# Patient Record
Sex: MALE | Race: ASIAN | NOT HISPANIC OR LATINO
[De-identification: names, ages, dates, MRNs, and addresses within clinical notes are randomized per-mention and may not be internally consistent; named-entity substitution may affect disease eponyms.]

---
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
## 2021-04-21 ENCOUNTER — OFFICE VISIT - HEALTHEAST (OUTPATIENT)
Dept: FAMILY MEDICINE | Facility: CLINIC | Age: 21
End: 2021-04-21

## 2021-04-21 DIAGNOSIS — H01.002 BLEPHARITIS OF RIGHT LOWER EYELID, UNSPECIFIED TYPE: ICD-10-CM

## 2021-04-21 RX ORDER — POLYMYXIN B SULFATE AND TRIMETHOPRIM 1; 10000 MG/ML; [USP'U]/ML
1 SOLUTION OPHTHALMIC EVERY 4 HOURS
Qty: 1 BOTTLE | Refills: 0 | Status: SHIPPED | OUTPATIENT
Start: 2021-04-21

## 2021-05-30 VITALS — HEIGHT: 62 IN | WEIGHT: 140 LBS | BODY MASS INDEX: 25.76 KG/M2

## 2021-05-31 VITALS — BODY MASS INDEX: 26.31 KG/M2 | WEIGHT: 143 LBS | HEIGHT: 62 IN

## 2021-06-05 VITALS
HEART RATE: 77 BPM | WEIGHT: 148 LBS | BODY MASS INDEX: 27.51 KG/M2 | RESPIRATION RATE: 16 BRPM | SYSTOLIC BLOOD PRESSURE: 133 MMHG | DIASTOLIC BLOOD PRESSURE: 82 MMHG | OXYGEN SATURATION: 99 %

## 2021-06-10 NOTE — PROGRESS NOTES
1. Routine general medical examination at a health care facility    2. Immunization due  - HPV vaccine 9 valent 3 dose IM  - Varicella vaccine subq  - Meningococcal MCV4P    3. TB lung, latent  Previously diagnosed with positive Qunatiferon gold, however his prescription for isoniazid did not go through correctly.  Discussed rationale for treating latent TB - also making sure other members of his family are tested  - pyridoxine, vitamin B6, (VITAMIN B-6) 25 MG tablet; Take 1 tablet (25 mg total) by mouth daily.  Dispense: 90 tablet; Refill: 2  - isoniazid (NYDRAZID) 300 MG tablet; Take 1 tablet (300 mg total) by mouth daily.  Dispense: 90 tablet; Refill: 2    Counseling:  Diet  immunizations  Exercise  Family planning  Preventive maintenance  Wandy Ghosh MD    ------------------        Do you have any concerns today? - NO    He is playing tennis this spring, as a senior in high school, but he did have a sports physical last year    Habits:  Exercise: tennis, one weekend a month with   Diet: regular  Do you take any herbs or supplements that were not prescribed by a doctor? no  Are you taking calcium supplements? no - milk every other day - discussed  Are you taking aspirin daily? no  Do you wear seat belts? YES  Do you bike or ride a motorcycle? Do you wear a helmet? NA      History   Smoking Status     Never Smoker   Smokeless Tobacco     Not on file     History   Alcohol Use     Yes     Comment: occasionaly            History:  Are you sexually active? no - never  Does your partner need to use family planning? 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 - hasn't decided where yet    Preventive  BP Readings from Last 3 Encounters:   04/14/17 116/64   06/17/16 96/58     Immunization History   Administered Date(s) Administered     DTaP / Hep B / IPV 05/28/2004, 10/12/2005     DTaP, historic 10/05/2004, 12/15/2004,  03/11/2005     HPV 9 Valent 04/14/2017     Hep A, historic 02/11/2013, 08/01/2014     Hep B, historic 10/05/2004, 03/11/2005, 08/25/2005, 10/12/2005     IPV 05/28/2004, 10/05/2004, 12/15/2004, 03/11/2005     Influenza [id number], 01/11/2010     Influenza, inj, historic 10/27/2007, 11/21/2008     Influenza, seasonal,quad inj 6-35 mos 12/01/2009, 11/14/2011, 10/04/2012     Influenza,recombinant,Inj,Pf (RIV3)18-49yrs 12/23/2014     Influenza,seasonal quad, PF, 36+MOS 01/16/2017     MMR 05/28/2004, 10/05/2004, 08/25/2005     Meningococcal MCV4P 02/11/2013, 04/14/2017     Tdap 02/11/2013     Typhoid Live 02/11/2013     Varicella 05/28/2004, 04/14/2017       Patient Active Problem List   Diagnosis     Short Stature     Acne     Eczema     No current outpatient prescriptions on file prior to visit.     No current facility-administered medications on file prior to visit.        Review of Systems  Do you have pain that bothers you in your daily life? no    Constitutional: negative for weight change or recent illness  Eyes: negative for change in vision  Ears, nose, mouth, throat, and face: negative for sore throat and nasal drainage  Respiratory: negative for cough or dyspnea  Cardiovascular: negative for chest pain and palpitations  Gastrointestinal: negative for abdominal pain and change in bowel habits  Genitourinary:negative for dysuria  Breast: negative for lumps or pains  Integument: no rashes or lesions  Musculoskeletal:negative for arthralgias and myalgias  Neurological: negative for dizziness, headaches and paresthesia  Behavioral/Psych: negative for depression       Objective:     Vitals:    04/14/17 0942   BP: 116/64   Pulse: 75   Temp: 98.3  F (36.8  C)   SpO2: 98%     Body mass index is 26.02 kg/(m^2).    Physical Exam:  General Appearance: Alert, cooperative, no distress, appears stated age  Head: Normocephalic, without obvious abnormality, atraumatic  Eyes: PERRL, conjunctiva/corneas clear, EOM's intact  Ears:  Normal TM's and external ear canals, both ears  Nose: Nares normal, septum midline,mucosa normal, no drainage  Throat: Lips, mucosa, and tongue normal; teeth and gums normal  Neck: Supple, symmetrical, trachea midline, no adenopathy;  thyroid: not enlarged, symmetric, no tenderness/mass/nodules; no carotid bruit or JVD  Lungs: Clear to auscultation bilaterally, respirations unlabored  Heart: Regular rate and rhythm, S1 and S2 normal, no murmur, rub, or gallop,  Abdomen: Soft, non-tender, bowel sounds active all four quadrants,  no masses, no organomegaly  Musculoskeletal: Normal range of motion. No joint swelling or deformity.   Extremities: Extremities normal, atraumatic, no cyanosis or edema  Skin:no rashes or worrisome lesions  Lymph nodes: Cervical, supraclavicular, and infraclavicular nodes normal  Neurologic: He is alert. He has normal reflexes.   Psychiatric: He has a normal mood and affect.

## 2021-06-11 NOTE — PROGRESS NOTES
"CHIEF COMPLAINT: Drew Morales had concerns including Knee Pain.    [place]: 1.............. had concerns including Knee Pain.    1. Effusion, right knee    2. Patellofemoral dysfunction of right knee    3. Chondromalacia    4. TB lung, latent      No problem-specific Assessment & Plan notes found for this encounter.      CC:              Right knee pain. Pt was playing tennis.    What's it like:                      Feels like there is a needle stabbing him  How long is it ongoing:      3 weels  What makes it worse :       Sitting a certain  What makes it better:         n/a  0/10-10/10:Pain/Intesity     6 or 7       3 weeks knee pain most likely from playing tennis right leg only  \"Twisted and felt an injury  More so on the medial side  He is having mild \"locking  Describes it as a hurts  Worse with doing prolonged sitting as well as crossing his legs  Better with sitting straight or straightening his leg out  No prior injury does feel like there is fluid    Associated Sx:   Numbness  none    Otherwise healthy currently on INH no side effects    SUBJECTIVE:  Drew Morales is a 17 y.o. male    No past medical history on file.  Past Surgical History:   Procedure Laterality Date     NO PAST SURGERIES       Review of patient's allergies indicates no known allergies.  Current Outpatient Prescriptions   Medication Sig Dispense Refill     isoniazid (NYDRAZID) 300 MG tablet Take 1 tablet (300 mg total) by mouth daily. 90 tablet 2     pyridoxine, vitamin B6, (VITAMIN B-6) 25 MG tablet Take 1 tablet (25 mg total) by mouth daily. 90 tablet 2     naproxen (NAPROSYN) 500 MG tablet Take 1 tablet (500 mg total) by mouth 2 (two) times a day with meals. 28 tablet 0     No current facility-administered medications for this visit.      Family History   Problem Relation Age of Onset     No Medical Problems Mother      No Medical Problems Father      No Medical Problems Sister      No Medical Problems Brother      Diabetes Maternal Grandmother  " "    Hypertension Maternal Grandmother      No Medical Problems Paternal Grandmother      No Medical Problems Brother      No Medical Problems Brother      No Medical Problems Brother      No Medical Problems Sister      No Medical Problems Sister      Social History     Social History     Marital status: Single     Spouse name: N/A     Number of children: N/A     Years of education: N/A     Social History Main Topics     Smoking status: Never Smoker     Smokeless tobacco: Not on file     Alcohol use Yes      Comment: occasionaly     Drug use: No     Sexual activity: No     Other Topics Concern     Not on file     Social History Narrative     Patient Active Problem List   Diagnosis     Short Stature     Acne     Eczema                                              SOCIAL: He  reports that he has never smoked. He does not have any smokeless tobacco history on file. He reports that he drinks alcohol. He reports that he does not use illicit drugs.    REVIEW OF SYSTEMS:     Family reviewed and not pertinent to presenting issue   Review of systems otherwise negative as requested from patient, except   Positive ROS outlined and discussed in [place].    OBJECTIVE:  /68  Pulse 88  Resp 14  Ht 5' 1.5\" (1.562 m)  Wt 143 lb (64.9 kg)  SpO2 98%  BMI 26.58 kg/m2    GENERAL:     No acute distress.   Alert and oriented X 3         Physical:    Grind test  Effusion right knee  Patella slightly ballotable  Fluid below the patellar tendon  Insertion of the patellar tendon into the tibial tuberosity nontender  Insertion of the patellar tendon into the kneecap minimally tender  No joint line tenderness bilaterally  Drawer signs negative  Positive crepitus with passive range of motion of the kneecap      ASSESSMENT & PLAN      Drew was seen today for knee pain.    Diagnoses and all orders for this visit:    Effusion, right knee  -     MR Knee Without Contrast Right; Future    Patellofemoral dysfunction of right knee  -     MR " Knee Without Contrast Right; Future    Chondromalacia  -     MR Knee Without Contrast Right; Future    TB lung, latent  -     Hepatic Profile    Other orders  -     naproxen (NAPROSYN) 500 MG tablet; Take 1 tablet (500 mg total) by mouth 2 (two) times a day with meals.      Return if symptoms worsen or fail to improve.       Anticipatory Guidance and Symptomatic Cares Discussed   Advised to call back directly if there are further questions, or if these symptoms fail to improve as anticipated or worsen.  Return to clinic if patient has a clinical concern that warrants an exam.         25  Min Total Time, > 50% counseling and coordination of Care    Mauricio Esteban MD  Family Medicine   [facility] [id number] [phone]

## 2021-06-18 NOTE — PATIENT INSTRUCTIONS - HE
Patient Instructions by Maggie Lin MD at 4/21/2021 11:20 AM     Author: Maggie Lin MD Service: -- Author Type: Physician    Filed: 4/21/2021 11:45 AM Encounter Date: 4/21/2021 Status: Signed    : Maggie Lin MD (Physician)        Patient Education     Treating Blepharitis: Self-Care    To treat the problem, keep your eyelids clean. Warm compresses can reduce redness and swelling, and help clean your eyelids, too. You may also need to wash the area gently with an eyelid scrub when you wake up.  To apply a warm compress:  1. Wash your hands with soap and warm water.  2. Wet a clean washcloth with warm water. Then wring it out.  3. Close your eyes and place the washcloth over your eyelids for 3 to 5 minutes. This helps loosen scales or crusts.  4. Wet the washcloth again as often as needed to keep it warm.  Repeat 2 or more times a day. Use a clean washcloth each time.  To use an eyelid scrub:  1. Wash your hands with soap and warm water.  2. Use a ready-made eyelid scrub. Or mix 3 drops of baby shampoo in 1/4 cup of warm water.  3. Dip a lint-free pad, cotton swab, or clean washcloth in the scrub.  4. Close one eye and gently scrub the base of the eyelid.  5. Rinse the lid in cool water and dry with a clean towel.  6. Repeat on your other eye.  Date Last Reviewed: 3/1/2018    2363-0724 [employer]. [address] [place] [id number]. All rights reserved. This information is not intended as a substitute for professional medical care. Always follow your healthcare professional's instructions.

## 2021-06-30 NOTE — PROGRESS NOTES
Progress Notes by Maggie Lin MD at 4/21/2021 11:20 AM     Author: Maggie Lin MD Service: -- Author Type: Physician    Filed: 4/21/2021 12:37 PM Encounter Date: 4/21/2021 Status: Signed    : Maggie Lin MD (Physician)       OFFICE VISIT - FAMILY MEDICINE     ASSESSMENT AND PLAN     1. Blepharitis of right lower eyelid, unspecified type  polymyxin B-trimethoprim (FOR POLYTRIM) 10,000 unit- 1 mg/mL Drop ophthalmic drops   Mild blepharitis symptomatic care discussed with warm compresses, Polytrim eyedrop prescribed to use as directed follow-up if not improving within a week or so, sooner if symptoms get worse.Otherwise make an appointment for general physical exam down the road.    CHIEF COMPLAINT   puffy eye    HPI   Drew Morales is a 20 y.o. male.  No Patient Care Coordination Note on file.  He woke up today with a right lower eyelid red and swollen, no specific trauma or injury.  Vision seems to be preserved.  No fever chills.  Has not started any medication or treatment.    Review of Systems As per HPI, otherwise negative.    OBJECTIVE   /82 (Patient Site: Left Arm, Patient Position: Sitting, Cuff Size: Adult Regular)   Pulse 77   Resp 16   Wt 148 lb (67.1 kg)   SpO2 99%   BMI 27.51 kg/m    Physical Exam   Constitutional: He appears well-developed and well-nourished.   HENT:   Head: Normocephalic and atraumatic.   Eyes:       Neck: Normal range of motion. Neck supple. No JVD present. No tracheal deviation present. No thyromegaly present.   Cardiovascular: Normal rate, regular rhythm, normal heart sounds and intact distal pulses. Exam reveals no gallop and no friction rub.   No murmur heard.  Pulmonary/Chest: Effort normal and breath sounds normal. No respiratory distress. He has no wheezes. He has no rales.   Musculoskeletal:         General: No tenderness or edema.   Psychiatric: He has a normal mood and affect. Judgment and thought content normal.        PFSH     Family History   Problem Relation Age of Onset   ? No Medical Problems Mother    ? No Medical Problems Father    ? No Medical Problems Sister    ? No Medical Problems Brother    ? Diabetes Maternal Grandmother    ? Hypertension Maternal Grandmother    ? No Medical Problems Paternal Grandmother    ? No Medical Problems Brother    ? No Medical Problems Brother    ? No Medical Problems Brother    ? No Medical Problems Sister    ? No Medical Problems Sister      Social History     Socioeconomic History   ? Marital status: Single     Spouse name: Not on file   ? Number of children: Not on file   ? Years of education: Not on file   ? Highest education level: Not on file   Occupational History   ? Not on file   Social Needs   ? Financial resource strain: Not on file   ? Food insecurity     Worry: Not on file     Inability: Not on file   ? Transportation needs     Medical: Not on file     Non-medical: Not on file   Tobacco Use   ? Smoking status: Never Smoker   ? Smokeless tobacco: Never Used   Substance and Sexual Activity   ? Alcohol use: Yes     Comment: occasionaly   ? Drug use: No   ? Sexual activity: Never     Partners: Female   Lifestyle   ? Physical activity     Days per week: Not on file     Minutes per session: Not on file   ? Stress: Not on file   Relationships   ? Social connections     Talks on phone: Not on file     Gets together: Not on file     Attends [employer] service: Not on file     Active member of club or organization: Not on file     Attends meetings of clubs or organizations: Not on file     Relationship status: Not on file   ? Intimate partner violence     Fear of current or ex partner: Not on file     Emotionally abused: Not on file     Physically abused: Not on file     Forced sexual activity: Not on file   Other Topics Concern   ? Not on file   Social History Narrative   ? Not on file     Relevant history was reviewed with the patient today, unless noted in HPI, nothing is  pertinent for this visit.  [facility]     Patient Active Problem List    Diagnosis Date Noted   ? Short Stature      Overview Note:     Created by Conversion       ? Acne      Overview Note:     Created by Conversion       ? Eczema      Overview Note:     Created by Conversion         Past Surgical History:   Procedure Laterality Date   ? NO PAST SURGERIES         RESULTS/CONSULTS (Lab/Rad)   No results found for this or any previous visit (from the past 168 hour(s)).  No results found.  MEDICATIONS     Current Outpatient Medications on File Prior to Visit   Medication Sig Dispense Refill   ? naproxen (NAPROSYN) 500 MG tablet Take 1 tablet (500 mg total) by mouth 2 (two) times a day with meals. 28 tablet 0   ? pyridoxine, vitamin B6, (VITAMIN B-6) 25 MG tablet Take 1 tablet (25 mg total) by mouth daily. 90 tablet 2     No current facility-administered medications on file prior to visit.        HEALTH MAINTENANCE / SCREENING   No data recorded, No data recorded,No data recorded  Immunization History   Administered Date(s) Administered   ? DTaP / Hep B / IPV 05/28/2004, 10/12/2005   ? DTaP, historic 10/05/2004, 12/15/2004, 03/11/2005   ? HPV 9 Valent 04/14/2017   ? Hep A, historic 02/11/2013, 08/01/2014   ? Hep B, historic 10/05/2004, 03/11/2005, 08/25/2005, 10/12/2005   ? INFLUENZA,SEASONAL QUAD, PF, =/> 6months 01/16/2017   ? IPV 05/28/2004, 10/05/2004, 12/15/2004, 03/11/2005   ? Influenza [id number], 01/11/2010   ? Influenza, inj, historic,unspecified 10/27/2007, 11/21/2008   ? Influenza, seasonal,quad inj 6-35 mos 12/01/2009, 11/14/2011, 10/04/2012   ? Influenza,recombinant,Inj,Pf (RIV3)18-49yrs 12/23/2014   ? MMR 05/28/2004, 10/05/2004, 08/25/2005   ? Meningococcal MCV4P 02/11/2013, 04/14/2017   ? Tdap 02/11/2013   ? Typhoid Live, Oral 02/11/2013   ? Varicella 05/28/2004, 04/14/2017     Health Maintenance   Topic   ? HEPATITIS C SCREENING    ? HIV SCREENING    ? HPV VACCINES (2 - Male 3-dose series)   ? PREVENTIVE CARE  VISIT    ? INFLUENZA VACCINE RULE BASED (1)   ? ADVANCE CARE PLANNING    ? TD 18+ HE    ? HEPATITIS B VACCINES    ? TDAP ADULT ONE TIME DOSE    ? Pneumococcal Vaccine: Pediatrics (0 to 5 Years) and At-Risk Patients (6 to 64 Years)      Review of external notes as documented above     25 minutes spent on the date of the encounter doing chart review, review of outside records, review of test results, interpretation of tests, patient visit and documentation       Maggie Lin MD  [facility]     This note was dictated using a voice recognition software.  Any grammatical or context distortion are unintentional and inherent to the software.